# Patient Record
Sex: FEMALE | Race: AMERICAN INDIAN OR ALASKA NATIVE
[De-identification: names, ages, dates, MRNs, and addresses within clinical notes are randomized per-mention and may not be internally consistent; named-entity substitution may affect disease eponyms.]

---

## 2018-06-10 ENCOUNTER — HOSPITAL ENCOUNTER (EMERGENCY)
Dept: HOSPITAL 41 - JD.ED | Age: 28
Discharge: HOME | End: 2018-06-10
Payer: MEDICAID

## 2018-06-10 DIAGNOSIS — I49.3: ICD-10-CM

## 2018-06-10 DIAGNOSIS — O99.412: Primary | ICD-10-CM

## 2018-06-10 NOTE — EDM.PDOC
ED HPI GENERAL MEDICAL PROBLEM





- General


Chief Complaint: Chest Pain


Stated Complaint: CHEST PAIN


Time Seen by Provider: 06/10/18 22:31


Source of Information: Reports: Patient, RN Notes Reviewed





- History of Present Illness


INITIAL COMMENTS - FREE TEXT/NARRATIVE: 





28-year-old female is here with palpitations. This started earlier this past 

evening and continues as sharp twinge type feeling of discomfort across mid 

chest. No shortness of breath or difficulty breathing. No nausea vomiting or 

other unusual symptoms. She's had this occasionally in the past but not nearly 

so frequent. She is normally healthy with no known history for diabetes 

hypertension or known heart problems.


Treatments PTA: Reports: Other (see below)


Other Treatments PTA: none


  ** Chest


Pain Score (Numeric/FACES): 7





- Related Data


 Allergies











Allergy/AdvReac Type Severity Reaction Status Date / Time


 


No Known Allergies Allergy   Verified 06/10/18 22:37














ED ROS GENERAL





- Review of Systems


Review Of Systems: See Below


HEENT: Reports: No Symptoms


Respiratory: Denies: Shortness of Breath, Pleuritic Chest Pain


Cardiovascular: Reports: Palpitations


GI/Abdominal: Denies: Abdominal Pain, Nausea, Vomiting


Musculoskeletal: Reports: No Symptoms


Skin: Reports: No Symptoms


Neurological: Reports: No Symptoms





ED EXAM, GENERAL





- Physical Exam


Exam: See Below


General Appearance: Alert, No Apparent Distress


Eye Exam: Bilateral Eye: PERRL


Throat/Mouth: Normal Inspection


Head: Atraumatic.  No: Facial Swelling


Neck: Supple, Full Range of Motion


Respiratory/Chest: No Respiratory Distress, Lungs Clear, Normal Breath Sounds


Cardiovascular: Normal Peripheral Pulses, Regular Rate, Rhythm


GI/Abdominal: Soft, Non-Tender


Extremities: Normal Inspection.  No: Pedal Edema, Leg Pain


Neurological: Alert, Oriented, No Motor/Sensory Deficits


Skin Exam: Warm, Dry, Normal Color





Course





- Vital Signs


Last Recorded V/S: 


 Last Vital Signs











Temp  98.0 F   06/10/18 22:21


 


Pulse  79   06/10/18 22:21


 


Resp  16   06/10/18 22:21


 


BP  128/86   06/10/18 22:21


 


Pulse Ox  99   06/10/18 22:21














- Orders/Labs/Meds


Orders: 


 Active Orders 24 hr











 Category Date Time Status


 


 EKG Documentation Completion [RC] URGENT Care  06/10/18 22:15 Active














- Re-Assessments/Exams


Free Text/Narrative Re-Assessment/Exam: 





06/11/18 15:23


Cardiac monitor initially was sinus rhythm no ectopy and then after a short 

Time I did see occasional PVCs that correlate with the symptoms she is having. 

Discussed with patient that these are benign, further workup not indicated this 

evening. Discharge instructions as documented.





Departure





- Departure


Time of Disposition: 22:56


Disposition: Home, Self-Care 01


Condition: Fair


Clinical Impression: 


 PVCs (premature ventricular contractions), Second trimester pregnancy





Instructions:  Premature Ventricular Contraction, Second Trimester of Pregnancy

, Easy-to-Read


Referrals: 


Luana Ellis MD [Primary Care Provider] - 


Forms:  ED Department Discharge


Additional Instructions: 


rest, drink plenty of water to maintain hydration,  follow up clinic tomorrow 

for recheck.  The PVC's you are having are annoying but are not a danger to you 

at this time. Your EKG, heart and lung exam is normal.  See your health 

provider tomorrow for follow up.  Try avoid caffiene if you are not already 

doing that.  Return to ED as needed.  





- My Orders


Last 24 Hours: 


My Active Orders





06/10/18 22:15


EKG Documentation Completion [RC] URGENT 














- Assessment/Plan


Last 24 Hours: 


My Active Orders





06/10/18 22:15


EKG Documentation Completion [RC] URGENT